# Patient Record
Sex: MALE | Race: WHITE | NOT HISPANIC OR LATINO | URBAN - METROPOLITAN AREA
[De-identification: names, ages, dates, MRNs, and addresses within clinical notes are randomized per-mention and may not be internally consistent; named-entity substitution may affect disease eponyms.]

---

## 2024-10-23 PROBLEM — E79.0 HYPERURICEMIA: Status: ACTIVE | Noted: 2024-10-23

## 2024-10-23 PROBLEM — R76.8 RHEUMATOID FACTOR POSITIVE: Status: ACTIVE | Noted: 2024-10-23

## 2024-10-23 PROBLEM — R76.8 POSITIVE ANA (ANTINUCLEAR ANTIBODY): Status: ACTIVE | Noted: 2024-10-23

## 2024-10-23 PROBLEM — R53.82 CHRONIC FATIGUE: Status: ACTIVE | Noted: 2024-10-23

## 2024-10-23 PROBLEM — G47.30 SLEEP APNEA: Status: ACTIVE | Noted: 2024-10-23

## 2024-10-23 PROBLEM — M25.50 MULTIPLE JOINT PAIN: Status: ACTIVE | Noted: 2024-10-23

## 2024-10-23 NOTE — ASSESSMENT & PLAN NOTE
Worst areas are right shoulder and left knee (history of injuries).    Minor R lateral epicondylitis. Other joints are doing well.   He is having no significant pain and no findings suggestive of inflammatory arthritis.  I can see in 1 year or sooner if needed.

## 2024-10-23 NOTE — ASSESSMENT & PLAN NOTE
14 with normal being less than 14.  IgM rheumatoid factor positive.  IgG and IgA and CCP antibodies all negative..    No clinical evidence of rheumatoid arthritis at this time. Recheck in one year. .

## 2024-10-23 NOTE — ASSESSMENT & PLAN NOTE
Rheumatoid factor positive at 14 with normal being less than 14.  IgM rheumatoid factor positive with negative IgG and IgA rheumatoid factor and negative CCP antibody.  Immunofluorescent antinuclear antibody +1:160 nucleolar with negative subtypes.  Hemoglobin A1c elevated..    Much better with weight loss and exercise.    No SLE symptoms, no fatigue or dyspnea. No RA symptoms.   He feels the beta blocker was the cause of his fatigue.   I will see in 12 months regarding the + tests..

## 2024-10-23 NOTE — PROGRESS NOTES
Office Follow Up      Date: 10/28/2024   Patient Name: Stuart Solorzano  MRN: [id number]  YOB: 1975    Referring Physician: No ref. provider found     Chief Complaint: Fatigue and joint pain.      History of Present Illness: Stuart Solorzano is a 49 y.o. male who is here today for follow up on joint pain and fatigue.  He also had a positive rheumatoid factor at 14 with normal being less than 14.  CCP antibody negative.  ALYSON +1: 160 nucleolar with negative CIERA and no signs of ALYSON associated disease.  No joint swelling. Some neck pain.   Fatigue is a better off Metoprolol and with diet, weight loss, and activity. . No muscle pain or dyspnea. Lost 71 lbs by diet over 3 years.   Had CTS on R-still with some numbness s/p release.   Having some bilateral elbow pain. Mild shoulder discomfort.   There is no history of malar rash, alopecia, photosensitivity, oral or nasal ulcers, Raynaud's phenomenon, fever, symptoms of serositis, or other lupus-like symptoms.    Pain scale: 2/10. EMS is 2 hrs.   The problem has not changed. The following symptoms are not reported:  pain. The locations affected since last visit are right hand. Pertinent negatives include fever, fatigue, change in vision, skin lesion(s), chest pain, changing cough, edema, joint swelling and abdominal pain  Had colon resection for diverticulitis in the interim. Did well except UTI after surgery.     Subjective   Review of Systems: Review of Systems   Constitutional:  Positive for fatigue and unexpected weight loss. Negative for chills and fever.        Night sweats   HENT:  Negative for dental problem, mouth sores, sinus pressure and swollen glands.         Dry mouth     Eyes:  Negative for photophobia, pain and redness.   Respiratory:  Negative for apnea, cough, chest tightness and shortness of breath.    Cardiovascular:  Negative for chest pain and leg swelling.   Gastrointestinal:  Negative for abdominal pain, diarrhea,  nausea and GERD.        Bloating  Loss of appetite   Genitourinary:  Negative for dysuria and hematuria.   Musculoskeletal:  Positive for arthralgias and neck stiffness.        Morning stiffness     Skin:  Negative for dry skin, rash, skin lesions and bruise.   Neurological:  Positive for numbness. Negative for dizziness, seizures, syncope, weakness, headache and memory problem.   Hematological:  Negative for adenopathy. Does not bruise/bleed easily.   Psychiatric/Behavioral:  Negative for sleep disturbance, suicidal ideas, depressed mood and stress. The patient is nervous/anxious.         Medications:   Current Outpatient Medications:     Accu-Chek Guide test strip, , Disp: , Rfl:     Accu-Chek Softclix Lancets lancets, , Disp: , Rfl:     allopurinol (ZYLOPRIM) 300 MG tablet, Take 1 tablet by mouth Daily., Disp: , Rfl:     amLODIPine-benazepril (Lotrel) 10-40 MG per capsule, Take 1 capsule by mouth. TAKE AS DIRECTED 3 TIMES EVERY WEEK, Disp: , Rfl:     atorvastatin (LIPITOR) 10 MG tablet, Take 1 tablet by mouth every night at bedtime., Disp: , Rfl:     chlorthalidone (HYGROTON) 25 MG tablet, Take 1 tablet by mouth Daily., Disp: , Rfl:     DULoxetine (CYMBALTA) 30 MG capsule, Take 1 capsule by mouth Daily. TAKE 2 CAP EVERY DAY AS DIRECTED, Disp: , Rfl:     Esomeprazole Magnesium (NEXIUM PO), Take 50 mg by mouth Daily., Disp: , Rfl:     metFORMIN (GLUCOPHAGE) 500 MG tablet, Take 1 tablet by mouth 2 (Two) Times a Day With Meals., Disp: , Rfl:     montelukast (SINGULAIR) 10 MG tablet, Take 1 tablet by mouth Daily., Disp: , Rfl:     PARoxetine (PAXIL) 30 MG tablet, Take 1 tablet by mouth Every Morning. (Patient taking differently: Take 2 tablets by mouth Every Morning.), Disp: , Rfl:     pioglitazone (ACTOS) 15 MG tablet, Take 1 tablet by mouth Daily., Disp: , Rfl:     Testosterone Cypionate (Testone CIK) 200 MG/ML kit, Inject  into the appropriate muscle as directed by prescriber. INJECT 0.25 MILLITER BY INTRAMUSCULAR  "ROUTE EVERY 3 WEEIKS, Disp: , Rfl:     Xiidra 5 % ophthalmic solution, INSTILL 1 DROP IN BOTH EYES EVERY 12 HOURS, Disp: , Rfl:     amLODIPine-benazepril (LOTREL) 10-20 MG per capsule, Take 1 capsule by mouth Daily. (Patient not taking: Reported on 10/28/2024), Disp: , Rfl:     hydroCHLOROthiazide 25 MG tablet, Take 1 tablet by mouth Daily. (Patient not taking: Reported on 10/28/2024), Disp: , Rfl:     Allergies:   Allergies   Allergen Reactions    Ranitidine Hives     SKIN RASH ITCHING ANAPHYLAXIS    Sulfa Antibiotics Provider Review Needed       I have reviewed and updated the patient's chief complaint, history of present illness, review of systems, past medical history, surgical history, family history, social history, medications and allergy list as appropriate.     Objective    Vital Signs:   Vitals:    10/28/24 1002   BP: 130/90   BP Location: Left arm   Patient Position: Sitting   Cuff Size: Adult   Pulse: 87   Temp: 97.3 °F (36.3 °C)   Weight: 114 kg (252 lb)   Height: 180.3 cm (71\")   PainSc:   2   PainLoc: Generalized     Body mass index is 35.15 kg/m².    Physical Exam:  GENERAL: The patient is obese, well-developed and well-nourished.  Cooperative and oriented ×3.  Affect is normal.  Hydration appears normal.  HEENT: Normocephalic and atraumatic.  No notable alopecia.  No facial rash.  Lids and conjunctiva are normal.  Pupils are equal and sclera are clear.  I see no oral or nasal ulcers.  Lips teeth and gums are within normal limits.  Oropharynx is clear.  NECK: Neck is supple without adenopathy, masses, or thyromegaly.  CARDIOVASCULAR: Normal S1, S2.  No murmurs are heard.  LUNGS: Clear to auscultation and percussion.  ABDOMEN: Soft, obese, and nontender without palpable masses or hepatosplenomegaly.  EXTREMITIES: No edema.  No cyanosis or clubbing.   SKIN: Palpation and inspection are normal.  I see no rashes, nodules, psoriatic lesions, or nail pits.  NEUROLOGIC: Gait is normal.   MUSCULOSKELETAL: " Complete joint exam was performed.  There is full range of motion of the shoulders, elbows, wrists, and hands without notable deformities, soft tissue swelling, synovitis, or atrophy. Scar on R hand from CT release. Tender over R lateral epicondyle.   Hips have good flexion and internal and external rotation.  Knees have no palpable effusions.  There is full flexion and full extension of the knees without pain.  Ankles have no soft tissue swelling or synovitis or major deformities.  BACK: Straight without notable scoliosis.    Assessment / Plan      Assessment & Plan  Multiple joint pain  Worst areas are right shoulder and left knee (history of injuries).    Minor R lateral epicondylitis. Other joints are doing well.   He is having no significant pain and no findings suggestive of inflammatory arthritis.  I can see in 1 year or sooner if needed.  Chronic fatigue  Rheumatoid factor positive at 14 with normal being less than 14.  IgM rheumatoid factor positive with negative IgG and IgA rheumatoid factor and negative CCP antibody.  Immunofluorescent antinuclear antibody +1:160 nucleolar with negative subtypes.  Hemoglobin A1c elevated..    Much better with weight loss and exercise.    No SLE symptoms, no fatigue or dyspnea. No RA symptoms.   He feels the beta blocker was the cause of his fatigue.   I will see in 12 months regarding the + tests..  Rheumatoid factor positive  14 with normal being less than 14.  IgM rheumatoid factor positive.  IgG and IgA and CCP antibodies all negative..    No clinical evidence of rheumatoid arthritis at this time. Recheck in one year. .  Positive ALYSON (antinuclear antibody)  1:160 nucleolar.  Antinuclear antibody subtypes all negative..    He has no symptoms or findings consistent with ALYSON associated illness.  Hyperuricemia  Uric acid 8.3 baseline.  Now on allopurinol by PCP.  Sleep apnea, unspecified type  On CPAP.  Lateral epicondylitis of right elbow  Mild. I offered PT and he  declined.     Orders Placed This Encounter   Procedures    CBC Auto Differential    Comprehensive Metabolic Panel    C-reactive Protein    Sedimentation Rate    Rheumatoid Factor    Cyclic Citrul Peptide Antibody, IgG / IgA    Uric Acid             Follow Up:   Return in about 1 year (around 10/28/2025).        Thee Gongora MD  [facility]

## 2024-10-23 NOTE — ASSESSMENT & PLAN NOTE
1:160 nucleolar.  Antinuclear antibody subtypes all negative..    He has no symptoms or findings consistent with ALYSON associated illness.

## 2024-10-28 ENCOUNTER — OFFICE VISIT (OUTPATIENT)
Age: 49
End: 2024-10-28
Payer: COMMERCIAL

## 2024-10-28 ENCOUNTER — LAB (OUTPATIENT)
Facility: HOSPITAL | Age: 49
End: 2024-10-28
Payer: COMMERCIAL

## 2024-10-28 VITALS
WEIGHT: 252 LBS | BODY MASS INDEX: 35.28 KG/M2 | HEART RATE: 87 BPM | SYSTOLIC BLOOD PRESSURE: 130 MMHG | DIASTOLIC BLOOD PRESSURE: 90 MMHG | HEIGHT: 71 IN | TEMPERATURE: 97.3 F

## 2024-10-28 DIAGNOSIS — R76.8 POSITIVE ANA (ANTINUCLEAR ANTIBODY): ICD-10-CM

## 2024-10-28 DIAGNOSIS — M25.50 MULTIPLE JOINT PAIN: ICD-10-CM

## 2024-10-28 DIAGNOSIS — E79.0 HYPERURICEMIA: ICD-10-CM

## 2024-10-28 DIAGNOSIS — R76.8 RHEUMATOID FACTOR POSITIVE: ICD-10-CM

## 2024-10-28 DIAGNOSIS — G47.30 SLEEP APNEA, UNSPECIFIED TYPE: ICD-10-CM

## 2024-10-28 DIAGNOSIS — R53.82 CHRONIC FATIGUE: ICD-10-CM

## 2024-10-28 DIAGNOSIS — M25.50 MULTIPLE JOINT PAIN: Primary | ICD-10-CM

## 2024-10-28 DIAGNOSIS — M77.11 LATERAL EPICONDYLITIS OF RIGHT ELBOW: ICD-10-CM

## 2024-10-28 LAB
BASOPHILS # BLD AUTO: 0.04 10*3/MM3 (ref 0–0.2)
BASOPHILS NFR BLD AUTO: 0.6 % (ref 0–1.5)
DEPRECATED RDW RBC AUTO: 43.3 FL (ref 37–54)
EOSINOPHIL # BLD AUTO: 0.15 10*3/MM3 (ref 0–0.4)
EOSINOPHIL NFR BLD AUTO: 2.4 % (ref 0.3–6.2)
ERYTHROCYTE [DISTWIDTH] IN BLOOD BY AUTOMATED COUNT: 13.5 % (ref 12.3–15.4)
ERYTHROCYTE [SEDIMENTATION RATE] IN BLOOD: 24 MM/HR (ref 0–15)
HCT VFR BLD AUTO: 45.4 % (ref 37.5–51)
HGB BLD-MCNC: 14.7 G/DL (ref 13–17.7)
IMM GRANULOCYTES # BLD AUTO: 0.02 10*3/MM3 (ref 0–0.05)
IMM GRANULOCYTES NFR BLD AUTO: 0.3 % (ref 0–0.5)
LYMPHOCYTES # BLD AUTO: 1.9 10*3/MM3 (ref 0.7–3.1)
LYMPHOCYTES NFR BLD AUTO: 30 % (ref 19.6–45.3)
MCH RBC QN AUTO: 28.6 PG (ref 26.6–33)
MCHC RBC AUTO-ENTMCNC: 32.4 G/DL (ref 31.5–35.7)
MCV RBC AUTO: 88.3 FL (ref 79–97)
MONOCYTES # BLD AUTO: 0.57 10*3/MM3 (ref 0.1–0.9)
MONOCYTES NFR BLD AUTO: 9 % (ref 5–12)
NEUTROPHILS NFR BLD AUTO: 3.65 10*3/MM3 (ref 1.7–7)
NEUTROPHILS NFR BLD AUTO: 57.7 % (ref 42.7–76)
NRBC BLD AUTO-RTO: 0 /100 WBC (ref 0–0.2)
PLATELET # BLD AUTO: 339 10*3/MM3 (ref 140–450)
PMV BLD AUTO: 10.6 FL (ref 6–12)
RBC # BLD AUTO: 5.14 10*6/MM3 (ref 4.14–5.8)
WBC NRBC COR # BLD AUTO: 6.33 10*3/MM3 (ref 3.4–10.8)

## 2024-10-28 PROCEDURE — 36415 COLL VENOUS BLD VENIPUNCTURE: CPT

## 2024-10-28 PROCEDURE — 86200 CCP ANTIBODY: CPT

## 2024-10-28 PROCEDURE — 80053 COMPREHEN METABOLIC PANEL: CPT

## 2024-10-28 PROCEDURE — 84550 ASSAY OF BLOOD/URIC ACID: CPT

## 2024-10-28 PROCEDURE — 99214 OFFICE O/P EST MOD 30 MIN: CPT | Performed by: INTERNAL MEDICINE

## 2024-10-28 PROCEDURE — 85025 COMPLETE CBC W/AUTO DIFF WBC: CPT

## 2024-10-28 PROCEDURE — 86431 RHEUMATOID FACTOR QUANT: CPT

## 2024-10-28 PROCEDURE — 86140 C-REACTIVE PROTEIN: CPT

## 2024-10-28 PROCEDURE — 85652 RBC SED RATE AUTOMATED: CPT

## 2024-10-28 RX ORDER — ALLOPURINOL 300 MG/1
1 TABLET ORAL DAILY
COMMUNITY

## 2024-10-28 RX ORDER — ATORVASTATIN CALCIUM 10 MG/1
1 TABLET, FILM COATED ORAL
COMMUNITY
Start: 2024-10-11

## 2024-10-28 RX ORDER — LANCETS
EACH MISCELLANEOUS
COMMUNITY
Start: 2024-10-25

## 2024-10-28 RX ORDER — BLOOD SUGAR DIAGNOSTIC
STRIP MISCELLANEOUS
COMMUNITY
Start: 2024-10-25

## 2024-10-28 RX ORDER — MONTELUKAST SODIUM 10 MG/1
1 TABLET ORAL DAILY
COMMUNITY

## 2024-10-28 RX ORDER — LIFITEGRAST 50 MG/ML
SOLUTION/ DROPS OPHTHALMIC
COMMUNITY

## 2024-10-29 LAB
ALBUMIN SERPL-MCNC: 4.4 G/DL (ref 3.5–5.2)
ALBUMIN/GLOB SERPL: 1.3 G/DL
ALP SERPL-CCNC: 52 U/L (ref 39–117)
ALT SERPL W P-5'-P-CCNC: 62 U/L (ref 1–41)
ANION GAP SERPL CALCULATED.3IONS-SCNC: 10.3 MMOL/L (ref 5–15)
AST SERPL-CCNC: 32 U/L (ref 1–40)
BILIRUB SERPL-MCNC: 0.4 MG/DL (ref 0–1.2)
BUN SERPL-MCNC: 14 MG/DL (ref 6–20)
BUN/CREAT SERPL: 14.3 (ref 7–25)
CALCIUM SPEC-SCNC: 10.1 MG/DL (ref 8.6–10.5)
CHLORIDE SERPL-SCNC: 96 MMOL/L (ref 98–107)
CHROMATIN AB SERPL-ACNC: 12.7 IU/ML (ref 0–14)
CO2 SERPL-SCNC: 30.7 MMOL/L (ref 22–29)
CREAT SERPL-MCNC: 0.98 MG/DL (ref 0.76–1.27)
CRP SERPL-MCNC: <0.3 MG/DL (ref 0–0.5)
EGFRCR SERPLBLD CKD-EPI 2021: 95.1 ML/MIN/1.73
GLOBULIN UR ELPH-MCNC: 3.3 GM/DL
GLUCOSE SERPL-MCNC: 108 MG/DL (ref 65–99)
POTASSIUM SERPL-SCNC: 4.4 MMOL/L (ref 3.5–5.2)
PROT SERPL-MCNC: 7.7 G/DL (ref 6–8.5)
SODIUM SERPL-SCNC: 137 MMOL/L (ref 136–145)
URATE SERPL-MCNC: 4.5 MG/DL (ref 3.4–7)

## 2024-10-30 LAB — CCP IGA+IGG SERPL IA-ACNC: 10 UNITS (ref 0–19)
